# Patient Record
(demographics unavailable — no encounter records)

---

## 2025-01-07 NOTE — PHYSICAL EXAM
[Alert] : alert [No Acute Distress] : no acute distress [Normocephalic] : normocephalic [Conjunctivae with no discharge] : conjunctivae with no discharge [PERRL] : PERRL [EOMI Bilateral] : EOMI bilateral [Auricles Well Formed] : auricles well formed [Clear Tympanic membranes with present light reflex and bony landmarks] : clear tympanic membranes with present light reflex and bony landmarks [No Discharge] : no discharge [Nares Patent] : nares patent [Pink Nasal Mucosa] : pink nasal mucosa [Palate Intact] : palate intact [Supple, full passive range of motion] : supple, full passive range of motion [No Palpable Masses] : no palpable masses [Symmetric Chest Rise] : symmetric chest rise [Clear to Auscultation Bilaterally] : clear to auscultation bilaterally [Regular Rate and Rhythm] : regular rate and rhythm [Normal S1, S2 present] : normal S1, S2 present [No Murmurs] : no murmurs [+2 Femoral Pulses] : +2 femoral pulses [Soft] : soft [NonTender] : non tender [Non Distended] : non distended [Normoactive Bowel Sounds] : normoactive bowel sounds [No Hepatomegaly] : no hepatomegaly [No Splenomegaly] : no splenomegaly [Sunny: _____] : Sunny [unfilled] [Testicles Descended Bilaterally] : testicles descended bilaterally [Patent] : patent [No fissures] : no fissures [No Abnormal Lymph Nodes Palpated] : no abnormal lymph nodes palpated [No Gait Asymmetry] : no gait asymmetry [No pain or deformities with palpation of bone, muscles, joints] : no pain or deformities with palpation of bone, muscles, joints [Normal Muscle Tone] : normal muscle tone [Straight] : straight [+2 Patella DTR] : +2 patella DTR [Cranial Nerves Grossly Intact] : cranial nerves grossly intact [No Rash or Lesions] : no rash or lesions [de-identified] : +slightly erythematous oropharynx, no exudates or tonsillar hypertrophy

## 2025-01-07 NOTE — HISTORY OF PRESENT ILLNESS
[Mother] : mother [whole] : whole milk [Fruit] : fruit [Vegetables] : vegetables [Meat] : meat [Grains] : grains [Eggs] : eggs [Fish] : fish [Dairy] : dairy [Eats healthy meals and snacks] : eats healthy meals and snacks [Eats meals with family] : eats meals with family [Normal] : Normal [In own bed] : In own bed [Sleeps ___ hours per night] : sleeps [unfilled] hours per night [Brushing teeth twice/d] : brushing teeth twice per day [Yes] : Patient goes to dentist yearly [Toothpaste] : Primary Fluoride Source: Toothpaste [Playtime (60 min/d)] : playtime 60 min a day [Participates in after-school activities] : participates in after-school activities [Appropiate parent-child-sibling interaction] : appropriate parent-child-sibling interaction [Has Friends] : has friends [Grade ___] : Grade [unfilled] [Adequate social interactions] : adequate social interactions [Adequate behavior] : adequate behavior [Adequate performance] : adequate performance [Adequate attention] : adequate attention [No] : No cigarette smoke exposure [Appropriately restrained in motor vehicle] : appropriately restrained in motor vehicle [Supervised outdoor play] : supervised outdoor play [Parent knows child's friends] : parent knows child's friends [Up to date] : Up to date [NO] : No [Exposure to electronic nicotine delivery system] : No exposure to electronic nicotine delivery system [FreeTextEntry7] : Last week had 2-3 days of runny nose, 1-2 episodes of nb/nb emesis and few episodes of diarrhea. Resolved. Since last night, complaining of intermittent belly pain and decreased appetite. Tmax 100. Possible chills. No cough, rhinorrhea, rashes, vomiting, diarrhea. Mom sick earlier in the week with stomach virus.  [de-identified] : None [de-identified] : Speech therapy 2x/week at school. Has IEP. [FreeTextEntry1] : 7 y/o M with pmhx of speech delay and hearing concern presenting for [facility]. Since last visit, pt has been evaluated by ENT for official audiologic exam which resulted normal. No further hearing concerns per mom and teachers. Pt also noted to have enlarged tonsils and adenoids at that appointment, but no interventions needed at this time. Pt was seen by ophthalmology for vision test which was normal, no glasses needed. Currently has been receiving speech therapy twice a week and has been improving. Has IEP at school. Performing well.

## 2025-01-07 NOTE — DISCUSSION/SUMMARY
[Normal Growth] : growth [Normal Development] : development [None] : No known medical problems [No Elimination Concerns] : elimination [No Feeding Concerns] : feeding [No Skin Concerns] : skin [Normal Sleep Pattern] : sleep [Full Activity without restrictions including Physical Education & Athletics] : Full Activity without restrictions including Physical Education & Athletics [] : The components of the vaccine(s) to be administered today are listed in the plan of care. The disease(s) for which the vaccine(s) are intended to prevent and the risks have been discussed with the caretaker.  The risks are also included in the appropriate vaccination information statements which have been provided to the patient's caregiver.  The caregiver has given consent to vaccinate. [FreeTextEntry1] : 7 y/o M with speech delay presenting for [facility]. Discussed that intermittent abdominal pain with decreased appetite since last night may be secondary to stomach virus/post viral ileus. No complaints of sore throat and no physical exam findings concerning for strep throat. Encouraged to monitor for vomiting, diarrhea, and worsening abdominal pain with fevers which would be concerning for appendicitis and warrant ED visit. Can take tylenol/motrin (with food) as needed for pain relief. Encouraged keeping adequate hydration and monitoring urine output, needs to pee at least 3 times daily. Call office for any worsening.   #Health Maintenance - Flu vaccine given today - F/u in 1 year or sooner if needed

## 2025-01-07 NOTE — END OF VISIT
[FreeTextEntry3] : Lit Dietz MD I reviewed the history & physical exam of patient & discussed with the resident. Agree with assessment & management plan as documented in residents note and addendums made as needed above.

## 2025-01-07 NOTE — REVIEW OF SYSTEMS
[Fatigue] : fatigue [Nausea] : nausea [Negative] : Genitourinary [Fever] : no fever [Vomiting] : no vomiting [Diarrhea] : no diarrhea [Constipation] : no constipation [FreeTextEntry1] : +intermittent abd pain

## 2025-03-12 NOTE — DISCUSSION/SUMMARY
[FreeTextEntry1] : 8 year old M presenting with 1 d left ear pain. Found to be slightly febrile orally here T 100.5. Bilateral TMs clear, no signs of AOM on exam. Throat clear, not indurated. 1 small, mobile lymph node behind left ear likely i/s/o early URI. PO and urine output per baseline. Discussed treating fever/ URI sx with supportive care measures. Discussed return precautions.

## 2025-03-12 NOTE — HISTORY OF PRESENT ILLNESS
[EENT/Resp Symptoms] : EENT/RESPIRATORY SYMPTOMS [FreeTextEntry6] : 8 y.o M presenting for 1 day L ear pain.

## 2025-03-12 NOTE — PHYSICAL EXAM
[Clear] : right tympanic membrane clear [NL] : warm, clear [Tender] : tender [Post Auricular] : post auricular [de-identified] : + 1 small, mobile, mildly enlarged lymph node behind left ear